# Patient Record
Sex: FEMALE | Race: ASIAN
[De-identification: names, ages, dates, MRNs, and addresses within clinical notes are randomized per-mention and may not be internally consistent; named-entity substitution may affect disease eponyms.]

---

## 2023-11-14 ENCOUNTER — APPOINTMENT (OUTPATIENT)
Dept: DERMATOLOGY | Facility: CLINIC | Age: 23
End: 2023-11-14
Payer: MEDICAID

## 2023-11-14 DIAGNOSIS — Z72.0 TOBACCO USE: ICD-10-CM

## 2023-11-14 DIAGNOSIS — F17.200 NICOTINE DEPENDENCE, UNSPECIFIED, UNCOMPLICATED: ICD-10-CM

## 2023-11-14 PROBLEM — Z00.00 ENCOUNTER FOR PREVENTIVE HEALTH EXAMINATION: Status: ACTIVE | Noted: 2023-11-14

## 2023-11-14 PROCEDURE — 99203 OFFICE O/P NEW LOW 30 MIN: CPT | Mod: 25

## 2023-11-14 PROCEDURE — 11900 INJECT SKIN LESIONS </W 7: CPT

## 2023-12-18 ENCOUNTER — APPOINTMENT (OUTPATIENT)
Dept: DERMATOLOGY | Facility: CLINIC | Age: 23
End: 2023-12-18
Payer: MEDICAID

## 2023-12-18 PROCEDURE — 11900 INJECT SKIN LESIONS </W 7: CPT

## 2023-12-18 PROCEDURE — 99213 OFFICE O/P EST LOW 20 MIN: CPT | Mod: 25

## 2023-12-18 NOTE — PHYSICAL EXAM
[Alert] : alert [Oriented x 3] : ~L oriented x 3 [Declined] : declined [FreeTextEntry3] : Focused exam: - well-circumscribed nummular patch of alopecia with some vellus hair regrowth in the left posterior parietal scalp - smaller well-circumscribed patch of alopecia on occipital scalp with atrophy. - irregular nail pitting noted on a few fingernails

## 2023-12-18 NOTE — HISTORY OF PRESENT ILLNESS
[FreeTextEntry1] : alopecia areata [de-identified] : 22yo F presents for follow up, last seen 11/14/23 alopecia areata - L posterior occipital scalp s/p ILK 2.5 mg/cc at last visit some hairs growing back no new areas of involvement did not get labs drawn after LV. Feels well overall.

## 2023-12-18 NOTE — ASSESSMENT
[FreeTextEntry1] : # Alopecia areata, scalp Chronic condition, flaring - Discussed diagnosis and variable natural course as well as association with other autoimmune conditions, particularly thyroid disease. TSH with reflex T4 ordered at , pt encouraged to get lab drawn today. - Discussed variable response and r/b/a of ILTAC injections vs topical steroids. Pt previously had good response to ILTAC injections however today there is some evidence of steroid atrophy at an old patch on occipital scalp -ILK #2 today to patch on L occipital scalp  Procedure note: Intralesional injection of triamcinolone (ILTAC) Site(s): Left posterior occipital scalp 1 lesions injected with intralesional triamcinolone 2.5 mg/cc 0.8 ccs injected total Verbal informed consent obtained. Risks/benefits/alternatives discussed including but not limited to risk of bleeding, hypopigmentation, striae and atrophy as well as possible lack of treatment efficacy and need for repeat treatments. Site confirmed. Area prepped with alcohol. Discussed wound care instructions. Patient tolerated well with no immediate complications.  RTC 4-6 weeks.

## 2023-12-19 LAB — TSH SERPL-ACNC: 2.75 UIU/ML

## 2024-02-05 ENCOUNTER — APPOINTMENT (OUTPATIENT)
Dept: DERMATOLOGY | Facility: CLINIC | Age: 24
End: 2024-02-05
Payer: COMMERCIAL

## 2024-02-05 DIAGNOSIS — L90.5 SCAR CONDITIONS AND FIBROSIS OF SKIN: ICD-10-CM

## 2024-02-05 DIAGNOSIS — L63.9 ALOPECIA AREATA, UNSPECIFIED: ICD-10-CM

## 2024-02-05 PROCEDURE — 99213 OFFICE O/P EST LOW 20 MIN: CPT

## 2024-02-05 NOTE — HISTORY OF PRESENT ILLNESS
[FreeTextEntry1] : alopecia areata [de-identified] : 24yo F presents for follow up, last seen 12/18/23 alopecia areata - L posterior occipital scalp s/p ILK 2.5 mg/cc at last visit (ILK x 2 total) some hairs growing back no new areas of involvement TSH done after LV is WNL. Feels well overall.  Also notes she has a small hairless patch on anterior frontal scalp since childhood with possible hx of trauma/scar in the area. Pt also complains of split ends and inquires about tx options.

## 2024-02-05 NOTE — ASSESSMENT
[FreeTextEntry1] : # Alopecia areata, scalp Chronic condition, improving with hair regrowth no new patches TSH after LV WNL s/p 2 treatments with ILK no need for repeat ILK today given improvement and hair regrowth on exam   # Scalp scar, frontal scalp - discussed this is scarred and ILK would not be helpful in this area  # Trichoptilosis - discussed gentle hair care and use of conditioner - trims to remove split ends  RTC PRN

## 2024-02-05 NOTE — PHYSICAL EXAM
[Alert] : alert [Oriented x 3] : ~L oriented x 3 [Declined] : declined [FreeTextEntry3] : Focused exam: - well-circumscribed nummular patch of alopecia with some vellus hair regrowth in the left posterior parietal scalp- interval improvement - smaller well-circumscribed patch of alopecia on occipital scalp with atrophy. - smaller well circumscribed patch of alopecia on frontal scalp- appears scarred, no hair follicle ostia seen.  - trichoptilosis on ends of hair

## 2024-04-22 ENCOUNTER — APPOINTMENT (OUTPATIENT)
Dept: DERMATOLOGY | Facility: CLINIC | Age: 24
End: 2024-04-22
Payer: COMMERCIAL

## 2024-04-22 DIAGNOSIS — L30.9 DERMATITIS, UNSPECIFIED: ICD-10-CM

## 2024-04-22 DIAGNOSIS — L29.9 PRURITUS, UNSPECIFIED: ICD-10-CM

## 2024-04-22 PROCEDURE — 99214 OFFICE O/P EST MOD 30 MIN: CPT

## 2024-04-22 RX ORDER — HYDROCORTISONE 25 MG/G
2.5 CREAM TOPICAL
Qty: 1 | Refills: 0 | Status: ACTIVE | COMMUNITY
Start: 2024-04-22 | End: 1900-01-01

## 2024-11-27 ENCOUNTER — APPOINTMENT (OUTPATIENT)
Dept: DERMATOLOGY | Facility: CLINIC | Age: 24
End: 2024-11-27
Payer: COMMERCIAL

## 2024-11-27 VITALS — WEIGHT: 128 LBS | HEIGHT: 65 IN | BODY MASS INDEX: 21.33 KG/M2

## 2024-11-27 DIAGNOSIS — L21.9 SEBORRHEIC DERMATITIS, UNSPECIFIED: ICD-10-CM

## 2024-11-27 DIAGNOSIS — L70.0 ACNE VULGARIS: ICD-10-CM

## 2024-11-27 PROCEDURE — 99214 OFFICE O/P EST MOD 30 MIN: CPT

## 2024-11-27 RX ORDER — TRETINOIN 0.5 MG/G
0.05 CREAM TOPICAL
Qty: 1 | Refills: 11 | Status: ACTIVE | COMMUNITY
Start: 2024-11-27 | End: 1900-01-01

## 2024-11-27 RX ORDER — KETOCONAZOLE 20 MG/ML
2 SUSPENSION TOPICAL
Qty: 1 | Refills: 11 | Status: ACTIVE | COMMUNITY
Start: 2024-11-27 | End: 1900-01-01

## 2024-11-27 RX ORDER — KETOCONAZOLE 20 MG/G
2 CREAM TOPICAL
Qty: 1 | Refills: 11 | Status: ACTIVE | COMMUNITY
Start: 2024-11-27 | End: 1900-01-01

## 2025-04-24 ENCOUNTER — APPOINTMENT (OUTPATIENT)
Dept: DERMATOLOGY | Facility: CLINIC | Age: 25
End: 2025-04-24
Payer: COMMERCIAL

## 2025-04-24 ENCOUNTER — NON-APPOINTMENT (OUTPATIENT)
Age: 25
End: 2025-04-24

## 2025-04-24 VITALS — BODY MASS INDEX: 22.33 KG/M2 | WEIGHT: 134 LBS | HEIGHT: 65 IN

## 2025-04-24 DIAGNOSIS — L30.9 DERMATITIS, UNSPECIFIED: ICD-10-CM

## 2025-04-24 DIAGNOSIS — L81.9 DISORDER OF PIGMENTATION, UNSPECIFIED: ICD-10-CM

## 2025-04-24 DIAGNOSIS — L70.0 ACNE VULGARIS: ICD-10-CM

## 2025-04-24 PROCEDURE — 99214 OFFICE O/P EST MOD 30 MIN: CPT

## 2025-04-24 RX ORDER — HYDROCORTISONE 25 MG/G
2.5 OINTMENT TOPICAL
Qty: 1 | Refills: 2 | Status: ACTIVE | COMMUNITY
Start: 2025-04-24 | End: 1900-01-01

## 2025-05-30 ENCOUNTER — APPOINTMENT (OUTPATIENT)
Dept: DERMATOLOGY | Facility: CLINIC | Age: 25
End: 2025-05-30

## 2025-05-30 DIAGNOSIS — L65.9 NONSCARRING HAIR LOSS, UNSPECIFIED: ICD-10-CM

## 2025-05-30 DIAGNOSIS — L70.0 ACNE VULGARIS: ICD-10-CM

## 2025-05-30 PROCEDURE — 99213 OFFICE O/P EST LOW 20 MIN: CPT | Mod: 25

## 2025-05-30 PROCEDURE — 11900 INJECT SKIN LESIONS </W 7: CPT

## 2025-05-30 RX ORDER — CLINDAMYCIN PHOSPHATE 10 MG/ML
1 LOTION TOPICAL
Qty: 1 | Refills: 11 | Status: ACTIVE | COMMUNITY
Start: 2025-05-30 | End: 1900-01-01

## 2025-08-11 ENCOUNTER — NON-APPOINTMENT (OUTPATIENT)
Age: 25
End: 2025-08-11

## 2025-08-11 ENCOUNTER — APPOINTMENT (OUTPATIENT)
Dept: OTOLARYNGOLOGY | Facility: CLINIC | Age: 25
End: 2025-08-11
Payer: COMMERCIAL

## 2025-08-11 VITALS
SYSTOLIC BLOOD PRESSURE: 110 MMHG | BODY MASS INDEX: 21.66 KG/M2 | HEIGHT: 65 IN | DIASTOLIC BLOOD PRESSURE: 63 MMHG | WEIGHT: 130 LBS | HEART RATE: 82 BPM

## 2025-08-11 DIAGNOSIS — Z01.10 ENCOUNTER FOR EXAMINATION OF EARS AND HEARING W/OUT ABNORMAL FINDINGS: ICD-10-CM

## 2025-08-11 PROCEDURE — 92567 TYMPANOMETRY: CPT

## 2025-08-11 PROCEDURE — 99204 OFFICE O/P NEW MOD 45 MIN: CPT | Mod: 25

## 2025-08-11 PROCEDURE — 92504 EAR MICROSCOPY EXAMINATION: CPT | Mod: LT,59

## 2025-08-11 PROCEDURE — 92557 COMPREHENSIVE HEARING TEST: CPT

## 2025-08-11 RX ORDER — PREDNISONE 10 MG/1
10 TABLET ORAL
Qty: 60 | Refills: 1 | Status: ACTIVE | COMMUNITY
Start: 2025-08-11 | End: 1900-01-01

## 2025-08-18 PROBLEM — T14.90XA TRAUMA, BLUNT: Status: ACTIVE | Noted: 2025-08-11

## 2025-08-18 PROBLEM — H90.12 CONDUCTIVE HEARING LOSS OF LEFT EAR WITH UNRESTRICTED HEARING OF RIGHT EAR: Status: ACTIVE | Noted: 2025-08-11

## 2025-08-18 PROBLEM — H72.02 CENTRAL PERFORATION OF TYMPANIC MEMBRANE OF LEFT EAR: Status: ACTIVE | Noted: 2025-08-11

## 2025-08-18 PROBLEM — G51.0 FACIAL PARALYSIS ON LEFT SIDE: Status: ACTIVE | Noted: 2025-08-11
